# Patient Record
Sex: FEMALE | Race: BLACK OR AFRICAN AMERICAN | NOT HISPANIC OR LATINO
[De-identification: names, ages, dates, MRNs, and addresses within clinical notes are randomized per-mention and may not be internally consistent; named-entity substitution may affect disease eponyms.]

---

## 2017-12-18 PROBLEM — Z00.00 ENCOUNTER FOR PREVENTIVE HEALTH EXAMINATION: Status: ACTIVE | Noted: 2017-12-18

## 2018-01-02 ENCOUNTER — APPOINTMENT (OUTPATIENT)
Dept: SURGERY | Facility: CLINIC | Age: 68
End: 2018-01-02
Payer: MEDICARE

## 2018-01-02 VITALS
BODY MASS INDEX: 25.61 KG/M2 | HEIGHT: 64 IN | HEART RATE: 108 BPM | SYSTOLIC BLOOD PRESSURE: 175 MMHG | DIASTOLIC BLOOD PRESSURE: 91 MMHG | WEIGHT: 150 LBS

## 2018-01-02 DIAGNOSIS — Z86.39 PERSONAL HISTORY OF OTHER ENDOCRINE, NUTRITIONAL AND METABOLIC DISEASE: ICD-10-CM

## 2018-01-02 DIAGNOSIS — Z86.79 PERSONAL HISTORY OF OTHER DISEASES OF THE CIRCULATORY SYSTEM: ICD-10-CM

## 2018-01-02 DIAGNOSIS — I10 ESSENTIAL (PRIMARY) HYPERTENSION: ICD-10-CM

## 2018-01-02 DIAGNOSIS — E11.9 TYPE 2 DIABETES MELLITUS W/OUT COMPLICATIONS: ICD-10-CM

## 2018-01-02 DIAGNOSIS — Z78.9 OTHER SPECIFIED HEALTH STATUS: ICD-10-CM

## 2018-01-02 PROCEDURE — 99203 OFFICE O/P NEW LOW 30 MIN: CPT

## 2018-01-04 PROBLEM — I10 HYPERTENSION: Status: ACTIVE | Noted: 2018-01-04

## 2018-01-04 PROBLEM — Z86.79 HISTORY OF MITRAL VALVE PROLAPSE: Status: RESOLVED | Noted: 2018-01-04 | Resolved: 2018-01-04

## 2018-01-04 PROBLEM — Z78.9 NON-SMOKER: Status: ACTIVE | Noted: 2018-01-04

## 2018-01-04 PROBLEM — E11.9 DM TYPE 2 (DIABETES MELLITUS, TYPE 2): Status: ACTIVE | Noted: 2018-01-04

## 2018-01-04 PROBLEM — Z86.39 HISTORY OF HYPERCHOLESTEROLEMIA: Status: RESOLVED | Noted: 2018-01-04 | Resolved: 2018-01-04

## 2018-01-04 RX ORDER — NITROFURANTOIN (MONOHYDRATE/MACROCRYSTALS) 25; 75 MG/1; MG/1
CAPSULE ORAL
Refills: 0 | Status: ACTIVE | COMMUNITY

## 2018-01-04 RX ORDER — SOLIFENACIN SUCCINATE 5 MG/1
5 TABLET, FILM COATED ORAL
Refills: 0 | Status: ACTIVE | COMMUNITY

## 2018-01-04 RX ORDER — ATORVASTATIN CALCIUM 80 MG/1
TABLET, FILM COATED ORAL
Refills: 0 | Status: ACTIVE | COMMUNITY

## 2018-01-04 RX ORDER — METFORMIN HYDROCHLORIDE 625 MG/1
TABLET ORAL
Refills: 0 | Status: ACTIVE | COMMUNITY

## 2018-01-04 RX ORDER — PROPRANOLOL HCL 80 MG
TABLET ORAL
Refills: 0 | Status: ACTIVE | COMMUNITY

## 2018-01-04 RX ORDER — LEVOTHYROXINE SODIUM 0.05 MG/1
50 TABLET ORAL
Refills: 0 | Status: ACTIVE | COMMUNITY

## 2018-01-08 ENCOUNTER — FORM ENCOUNTER (OUTPATIENT)
Age: 68
End: 2018-01-08

## 2018-01-09 ENCOUNTER — OUTPATIENT (OUTPATIENT)
Dept: OUTPATIENT SERVICES | Facility: HOSPITAL | Age: 68
LOS: 1 days | End: 2018-01-09
Payer: MEDICARE

## 2018-01-09 ENCOUNTER — RESULT REVIEW (OUTPATIENT)
Age: 68
End: 2018-01-09

## 2018-01-09 ENCOUNTER — APPOINTMENT (OUTPATIENT)
Dept: ULTRASOUND IMAGING | Facility: IMAGING CENTER | Age: 68
End: 2018-01-09
Payer: MEDICARE

## 2018-01-09 DIAGNOSIS — Z00.8 ENCOUNTER FOR OTHER GENERAL EXAMINATION: ICD-10-CM

## 2018-01-09 PROCEDURE — 88172 CYTP DX EVAL FNA 1ST EA SITE: CPT

## 2018-01-09 PROCEDURE — 88173 CYTOPATH EVAL FNA REPORT: CPT

## 2018-01-09 PROCEDURE — 88173 CYTOPATH EVAL FNA REPORT: CPT | Mod: 26

## 2018-01-09 PROCEDURE — 10022: CPT

## 2018-01-09 PROCEDURE — 76942 ECHO GUIDE FOR BIOPSY: CPT | Mod: 26

## 2018-01-09 PROCEDURE — 76942 ECHO GUIDE FOR BIOPSY: CPT

## 2018-01-16 LAB — NON-GYNECOLOGICAL CYTOLOGY STUDY: SIGNIFICANT CHANGE UP

## 2018-01-29 ENCOUNTER — OTHER (OUTPATIENT)
Age: 68
End: 2018-01-29

## 2018-07-10 ENCOUNTER — APPOINTMENT (OUTPATIENT)
Dept: SURGERY | Facility: CLINIC | Age: 68
End: 2018-07-10
Payer: MEDICARE

## 2018-07-10 PROCEDURE — 99213 OFFICE O/P EST LOW 20 MIN: CPT

## 2019-04-30 ENCOUNTER — APPOINTMENT (OUTPATIENT)
Dept: SURGERY | Facility: CLINIC | Age: 69
End: 2019-04-30
Payer: MEDICARE

## 2019-04-30 PROCEDURE — 99213 OFFICE O/P EST LOW 20 MIN: CPT

## 2019-04-30 NOTE — HISTORY OF PRESENT ILLNESS
[de-identified] : prior evaluation of thyroid nodule. initial FNA showed AUS, repeat benign. s/p  left  thyroid lobectomy many years ago.  last sonogram showed nodule decreased in size. denies dysphagia, hoarseness or new lesions. no changes medically since last visit.  Synthroid recently increased to 50 mcg daily

## 2019-04-30 NOTE — PHYSICAL EXAM
[de-identified] : well healed scar from prior surgery [de-identified] : 1 cm right thyroid nodule, well circumscribed and mobile [Laryngoscopy Performed] : laryngoscopy was performed, see procedure section for findings [Midline] : located in midline position [Normal] : orientation to person, place, and time: normal

## 2019-04-30 NOTE — ASSESSMENT
[FreeTextEntry1] : will observe. sonogram requested. to call next week for results. blood tests per PCP. to return earlier if any change

## 2019-04-30 NOTE — PROCEDURE
HPI     Concerns About Ocular Health    Additional comments: Pt here for ocular health exam.            Comments   Last eye exam: Over 5 years ago.    C/o; Pt states her glasses are over 5 years old but she is having some   problems with tearing of both eyes.     Meds: None       Last edited by Michelle Pacheco MA on 5/14/2018  9:15 AM. (History)            Assessment /Plan     For exam results, see Encounter Report.    Nuclear sclerosis, bilateral  -Educated patient on presence of cataracts at today's exam, monitor at annual dilated fundus exam. 5+ years surgical estimate.    Meibomian gland dysfunction (MGD), bilateral, both upper and lower lids  -Optive PRN    Glaucoma screening  -Monitor with annual eye exam and IOP check    Hyperopia of both eyes with astigmatism and presbyopia  Eyeglass Final Rx     Eyeglass Final Rx       Sphere Cylinder Axis Dist VA Add    Right +3.00 +1.00 170 20/25 +2.50    Left +3.00 +1.50 175 20/30 +2.50    Type:  Bifocal    Expiration Date:  5/15/2019                  RTC 1 yr                  [None] : none [Normal] : normal [Lesion(s)] : no lesions

## 2019-05-14 ENCOUNTER — OTHER (OUTPATIENT)
Age: 69
End: 2019-05-14

## 2019-10-01 ENCOUNTER — OTHER (OUTPATIENT)
Age: 69
End: 2019-10-01

## 2020-09-17 ENCOUNTER — APPOINTMENT (OUTPATIENT)
Dept: SURGERY | Facility: CLINIC | Age: 70
End: 2020-09-17
Payer: MEDICARE

## 2020-09-17 PROCEDURE — 99213 OFFICE O/P EST LOW 20 MIN: CPT

## 2020-09-17 NOTE — PHYSICAL EXAM
[de-identified] : well healed scar from prior surgery [de-identified] : 1 cm right thyroid nodule, well circumscribed and mobile [Laryngoscopy Performed] : laryngoscopy was performed, see procedure section for findings [Midline] : located in midline position [Normal] : orientation to person, place, and time: normal

## 2020-09-17 NOTE — HISTORY OF PRESENT ILLNESS
[de-identified] : prior evaluation of thyroid nodule. initial FNA showed AUS, repeat benign. s/p  left  thyroid lobectomy many years ago.  recent sonogram stable. denies dysphagia, hoarseness or new lesions. no changes medically since last visit.  Synthroid recently increased to 50 mcg alt with 75 mcg

## 2020-09-17 NOTE — ASSESSMENT
[FreeTextEntry1] : will observe. sonogram next visit. blood tests per PCP. to return earlier if any change

## 2021-10-07 ENCOUNTER — APPOINTMENT (OUTPATIENT)
Dept: SURGERY | Facility: CLINIC | Age: 71
End: 2021-10-07
Payer: MEDICARE

## 2021-10-07 DIAGNOSIS — E04.1 NONTOXIC SINGLE THYROID NODULE: ICD-10-CM

## 2021-10-07 PROCEDURE — 36415 COLL VENOUS BLD VENIPUNCTURE: CPT

## 2021-10-07 PROCEDURE — 99214 OFFICE O/P EST MOD 30 MIN: CPT | Mod: 25

## 2021-10-07 NOTE — ASSESSMENT
[FreeTextEntry1] : will observe. sonogram next visit. blood tests performed. to call next week for results. . to return earlier if any change.  patient has been given the opportunity to ask questions, and all of the patient's questions have been answered to their satisfaction\par

## 2021-10-07 NOTE — HISTORY OF PRESENT ILLNESS
[de-identified] : prior evaluation of thyroid nodule. initial FNA showed AUS, repeat benign. s/p  left  thyroid lobectomy many years ago for benign disease.  recent sonogram stable to improved. . denies dysphagia, hoarseness or new lesions. no changes medically since last visit.  Synthroid  50 mcg alt with 75 mcg.  I have reviewed all old and new data and available images.\par

## 2021-10-07 NOTE — PHYSICAL EXAM
[de-identified] : well healed scar from prior surgery [de-identified] : 1 cm right thyroid nodule, well circumscribed and mobile [Laryngoscopy Performed] : laryngoscopy was performed, see procedure section for findings [Midline] : located in midline position [Normal] : orientation to person, place, and time: normal

## 2021-10-08 LAB
T3 SERPL-MCNC: 86 NG/DL
T4 FREE SERPL-MCNC: 1.9 NG/DL
THYROGLOB AB SERPL-ACNC: <20 IU/ML
THYROPEROXIDASE AB SERPL IA-ACNC: 17.3 IU/ML
TSH SERPL-ACNC: 2.09 UIU/ML

## 2021-10-15 ENCOUNTER — NON-APPOINTMENT (OUTPATIENT)
Age: 71
End: 2021-10-15

## 2023-01-10 ENCOUNTER — APPOINTMENT (OUTPATIENT)
Dept: SURGERY | Facility: CLINIC | Age: 73
End: 2023-01-10